# Patient Record
Sex: MALE | Race: WHITE | Employment: OTHER | URBAN - METROPOLITAN AREA
[De-identification: names, ages, dates, MRNs, and addresses within clinical notes are randomized per-mention and may not be internally consistent; named-entity substitution may affect disease eponyms.]

---

## 2017-04-10 ENCOUNTER — HOSPITAL ENCOUNTER (OUTPATIENT)
Dept: GENERAL RADIOLOGY | Age: 72
Discharge: HOME | End: 2017-04-10
Payer: MEDICARE

## 2017-04-10 DIAGNOSIS — C64.9 RENAL CELL CARCINOMA (HCC): ICD-10-CM

## 2017-04-10 PROCEDURE — 71020 XR CHEST PA LAT: CPT

## 2017-12-12 ENCOUNTER — HOSPITAL ENCOUNTER (OUTPATIENT)
Dept: MRI IMAGING | Age: 72
Discharge: HOME | End: 2017-12-12
Attending: INTERNAL MEDICINE
Payer: MEDICARE

## 2017-12-12 VITALS — RESPIRATION RATE: 14 BRPM | HEART RATE: 60 BPM

## 2017-12-12 DIAGNOSIS — I48.91 ATRIAL FIBRILLATION (HCC): ICD-10-CM

## 2017-12-12 LAB — CREAT BLD-MCNC: 1.2 MG/DL (ref 0.6–1.3)

## 2017-12-12 PROCEDURE — 75561 CARDIAC MRI FOR MORPH W/DYE: CPT

## 2017-12-12 PROCEDURE — 74011636320 HC RX REV CODE- 636/320: Performed by: INTERNAL MEDICINE

## 2017-12-12 PROCEDURE — 74011250636 HC RX REV CODE- 250/636: Performed by: INTERNAL MEDICINE

## 2017-12-12 PROCEDURE — 82565 ASSAY OF CREATININE: CPT

## 2017-12-12 PROCEDURE — A9579 GAD-BASE MR CONTRAST NOS,1ML: HCPCS | Performed by: INTERNAL MEDICINE

## 2017-12-12 RX ORDER — MIDAZOLAM HYDROCHLORIDE 1 MG/ML
2 INJECTION, SOLUTION INTRAMUSCULAR; INTRAVENOUS
Status: DISCONTINUED | OUTPATIENT
Start: 2017-12-12 | End: 2017-12-12

## 2017-12-12 RX ORDER — DIPHENHYDRAMINE HYDROCHLORIDE 50 MG/ML
INJECTION, SOLUTION INTRAMUSCULAR; INTRAVENOUS
Status: DISCONTINUED
Start: 2017-12-12 | End: 2017-12-12

## 2017-12-12 RX ORDER — DIPHENHYDRAMINE HYDROCHLORIDE 50 MG/ML
25-50 INJECTION, SOLUTION INTRAMUSCULAR; INTRAVENOUS ONCE
Status: COMPLETED | OUTPATIENT
Start: 2017-12-12 | End: 2017-12-12

## 2017-12-12 RX ADMIN — DIPHENHYDRAMINE HYDROCHLORIDE 50 MG: 50 INJECTION, SOLUTION INTRAMUSCULAR; INTRAVENOUS at 15:02

## 2017-12-12 RX ADMIN — GADOPENTETATE DIMEGLUMINE 40 ML: 469.01 INJECTION INTRAVENOUS at 16:02

## 2017-12-12 RX ADMIN — MIDAZOLAM HYDROCHLORIDE 1 MG: 1 INJECTION, SOLUTION INTRAMUSCULAR; INTRAVENOUS at 15:34

## 2017-12-12 NOTE — ROUTINE PROCESS
discharge instructions per Dr. Chaparrita Luz reviewed with the patient. The patient verbalized understanding. Pt understands his wife is to drive home. Pt drinking water on discharge. Pt stable without c/o pain. No written instructions given to pt. R/t verbal instructions. Dr. Chaparrita Luz ok'd pt. For discharge post procedure.

## 2017-12-12 NOTE — PROGRESS NOTES
Dr. Nicolas Burks instructing to give pt a sip of water and 1 mg Versed IVP. Pt c/o \"dry mouth\". Sip of water given to pt.

## 2017-12-12 NOTE — PROGRESS NOTES
Pt responding to request of MRI Be agarwal, during remaining procedure with Dr. Yves Madrigal viewing and observing pt.

## 2017-12-12 NOTE — PROGRESS NOTES
Preadmission assessment completed per Dr. Hebert Hampton at beginning of procedure and reassessment prior to pt. Getting versed.

## 2017-12-12 NOTE — PROGRESS NOTES
IV started per MRI for procedure - no swelling, pain, bleeding noted from IV site - accessed site without difficulty and flushed with NS.

## 2017-12-12 NOTE — PROGRESS NOTES
Pt states he last had a cup of coffee at 0800 a.m. - Dr. Denis Sanchez requested we have Versed on Hold to give pt. If he needs to administer for his cardiac MRI r/t pt. Being claustrophobic.

## 2017-12-12 NOTE — PROGRESS NOTES
Called by Lela Martinez, MRI, to inform Dr. Lillian Zafar requesting pt. To get Benadryl IVP prior to his Cardiac MRI. Spoke with Dr. Lillian Zafar who ordered 50 mg Benadyl IVP.

## 2020-09-15 ENCOUNTER — HOSPITAL ENCOUNTER (OUTPATIENT)
Dept: MRI IMAGING | Age: 75
Discharge: HOME | End: 2020-09-15
Attending: ORTHOPAEDIC SURGERY

## 2020-09-15 DIAGNOSIS — M54.12 BRACHIAL NEURITIS: ICD-10-CM

## 2020-10-12 ENCOUNTER — HOSPITAL ENCOUNTER (OUTPATIENT)
Dept: MRI IMAGING | Age: 75
Discharge: HOME | End: 2020-10-12
Attending: ORTHOPAEDIC SURGERY
Payer: MEDICARE

## 2020-10-12 VITALS
OXYGEN SATURATION: 100 % | DIASTOLIC BLOOD PRESSURE: 66 MMHG | HEART RATE: 62 BPM | RESPIRATION RATE: 13 BRPM | SYSTOLIC BLOOD PRESSURE: 148 MMHG

## 2020-10-12 PROCEDURE — 99152 MOD SED SAME PHYS/QHP 5/>YRS: CPT

## 2020-10-12 PROCEDURE — 99153 MOD SED SAME PHYS/QHP EA: CPT

## 2020-10-12 PROCEDURE — 72141 MRI NECK SPINE W/O DYE: CPT

## 2020-10-12 PROCEDURE — 74011250636 HC RX REV CODE- 250/636: Performed by: RADIOLOGY

## 2020-10-12 RX ORDER — FENTANYL CITRATE 50 UG/ML
100 INJECTION, SOLUTION INTRAMUSCULAR; INTRAVENOUS
Status: DISCONTINUED | OUTPATIENT
Start: 2020-10-12 | End: 2020-10-12

## 2020-10-12 RX ORDER — MIDAZOLAM HYDROCHLORIDE 1 MG/ML
5 INJECTION, SOLUTION INTRAMUSCULAR; INTRAVENOUS
Status: DISCONTINUED | OUTPATIENT
Start: 2020-10-12 | End: 2020-10-12

## 2020-10-12 RX ADMIN — MIDAZOLAM HYDROCHLORIDE 1 MG: 1 INJECTION, SOLUTION INTRAMUSCULAR; INTRAVENOUS at 07:57

## 2020-10-12 RX ADMIN — FENTANYL CITRATE 25 MCG: 50 INJECTION, SOLUTION INTRAMUSCULAR; INTRAVENOUS at 07:57

## 2020-10-12 RX ADMIN — MIDAZOLAM HYDROCHLORIDE 1 MG: 1 INJECTION, SOLUTION INTRAMUSCULAR; INTRAVENOUS at 07:53

## 2020-10-12 RX ADMIN — MIDAZOLAM HYDROCHLORIDE 1 MG: 1 INJECTION, SOLUTION INTRAMUSCULAR; INTRAVENOUS at 08:03

## 2020-10-12 RX ADMIN — FENTANYL CITRATE 25 MCG: 50 INJECTION, SOLUTION INTRAMUSCULAR; INTRAVENOUS at 07:53

## 2020-10-12 RX ADMIN — FENTANYL CITRATE 25 MCG: 50 INJECTION, SOLUTION INTRAMUSCULAR; INTRAVENOUS at 08:03

## 2020-10-12 NOTE — DISCHARGE INSTRUCTIONS
Sedation for a Medical Procedure: After Your Visit  Instructions. Sedatives are used to relax you for a procedure. You may or may not be awake during the procedure. Common side effects of sedation medications include:                   Drowsiness, dizziness, euphoria, sleepiness or confusion. I              Unsteady gait. Loss of fine muscle control and delayed reaction time. Visual disturbances, difficulty focusing and blurred vision. Impaired memory recall. Follow-up care is a key component for your health and safety. Be sure to make and go to all medical appointments. Call your doctor if you are having problems. It's also a good idea to keep a list of your allergies, medicines with doses and test results on hand    Home care following your sedation procedure: You may experience some of these side effects or you may not be aware of subtle changes in your behavior or reaction time. Because you received these medications, we are giving you the following instructions: Activity    For your safety, you should not drive until the medicine wears off and you can think clearly and react easily. Do not drive for 24 hours.  Rest when you feel tired. Getting enough sleep will help you recover. Diet     You can eat your normal diet. If your stomach is upset, try bland, low-fat foods like plain rice, broiled chicken, toast, and yogurt.  Drink plenty of fluids unless your doctor advised you to limit your fluids.  Do not consume alcoholic beverages for 24 hours. Instructions   Do not make important personal, business, or legal decisions for 24 hours.  Move slowly and carefully, do not make sudden position changes.  Be alert for dizziness or lightheadedness and move accordingly.  Have a responsible person assist you.  Do not drive for 24 hours.  Do not operate equipment for 24 hours. [employer], power tools, kitchen appliances, etc.)    Discharge medications:   Resume prior to test medications as prescribed by your personal physician. If you have any questions or concerns call Radiology RN at [phone]  After hours call Radiology on-call at [phone], [facility]      Call 911 any time you think you may need emergency care. For example:                Call if you passed out (lost consciousness).  The medicine is not wearing off and you cannot think clearly. Watch closely for changes in your health, and be sure to contact your doctor if you have any problems. Where can you learn more? Go to [email or web address]   Enter G817 in the search box to learn more about \"Sedation for a Medical Procedure: After Your Visit. \"

## 2020-10-12 NOTE — H&P
Radiology History and Physical    Patient: Latonia North 69 y.o. male       Chief Complaint: No chief complaint on file.       History of Present Illness: Neck pain    History:    Past Medical History:   Diagnosis Date    Allergic rhinitis     Arthritis     BPH (benign prostatic hypertrophy)     CAD (coronary artery disease)     MI x6, last one appr 5 yr ago    Cancer [facility]) 2012    renal    Chronic kidney disease     renal insufficiency    COPD     and emphysema- 2 liters O2 at night and prn    Dyslipidemia     GERD (gastroesophageal reflux disease)     Heart failure (HCC)     CHF    Hypertension     Ill-defined condition     pulmonary nodule- CT every year    Leukemia in remission ([facility] [address] 14     leukemia - currently in remission- completed chemo     MI (myocardial infarction) ([facility] [address]     x 6    Obesity     Psychiatric disorder     depression    Unspecified adverse effect of anesthesia     respiratory failure after CABG -- vented for extended time- ICU for 8 days    Unspecified sleep apnea     uses CPAP nightly     Family History   Problem Relation Age of Onset    Hypertension Father      Social History     Socioeconomic History    Marital status:      Spouse name: Not on file    Number of children: Not on file    Years of education: Not on file    Highest education level: Not on file   Occupational History    Not on file   Social Needs    Financial resource strain: Not on file    Food insecurity     Worry: Not on file     Inability: Not on file    Transportation needs     Medical: Not on file     Non-medical: Not on file   Tobacco Use    Smoking status: Former Smoker     Packs/day: 3.00     Years: 35.00     Pack years: 105.00     Last attempt to quit: 2001     Years since quittin.0    Smokeless tobacco: Never Used   Substance and Sexual Activity    Alcohol use: No    Drug use: No    Sexual activity: Not on file   Lifestyle    Physical activity     Days per week: Not on file     Minutes per session: Not on file    Stress: Not on file   Relationships    Social connections     Talks on phone: Not on file     Gets together: Not on file     Attends [employer] service: Not on file     Active member of club or organization: Not on file     Attends meetings of clubs or organizations: Not on file     Relationship status: Not on file    Intimate partner violence     Fear of current or ex partner: Not on file     Emotionally abused: Not on file     Physically abused: Not on file     Forced sexual activity: Not on file   Other Topics Concern    Not on file   Social History Narrative    Not on file       Allergies: Allergies   Allergen Reactions    Zithromax [Azithromycin] Photosensitivity     Developed skin blisters       Current Medications:  Current Outpatient Medications   Medication Sig    lqzwzs-nszzp-i. blue-sal-naphos (URELLE) 81-0.12 mg tab tablet Take 1 Tab by mouth four (4) times daily as needed for Other (burning on urination).  potassium chloride (K-DUR, KLOR-CON) 20 mEq tablet Take 20 mEq by mouth daily.  oxyCODONE-acetaminophen (PERCOCET) 5-325 mg per tablet Take 1-2 Tabs by mouth every four (4) hours as needed for Pain. Max Daily Amount: 12 Tabs.  albuterol sulfate (PROVENTIL;VENTOLIN) 2.5 mg/0.5 mL nebu nebulizer solution by Nebulization route every four (4) hours as needed for Wheezing.  amiodarone (PACERONE) 100 mg tablet Take 100 mg by mouth daily.  HYDROcodone-acetaminophen (NORCO) 7.5-325 mg per tablet Take 1 Tab by mouth every six (6) hours as needed for Pain.  apixaban (ELIQUIS) 5 mg tablet Take 5 mg by mouth two (2) times a day.  Urinary Bag (URINARY LEG BAG) misc 1 Device by Does Not Apply route daily.  metoprolol (LOPRESSOR) 50 mg tablet Take 25 mg by mouth two (2) times a day.  tamsulosin (FLOMAX) 0.4 mg capsule Take 0.4 mg by mouth two (2) times a day.     budesonide-formoterol (SYMBICORT) 80-4.5 mcg/actuation HFAA inhaler Take 2 Puffs by inhalation two (2) times a day.  Cholecalciferol, Vitamin D3, (VITAMIN D3) 1,000 unit cap Take 1 Tab by mouth daily.  Omeprazole delayed release (PRILOSEC D/R) 20 mg tablet Take 20 mg by mouth two (2) times a day.  tiotropium (SPIRIVA WITH HANDIHALER) 18 mcg inhalation capsule Take 1 Cap by inhalation daily.  albuterol (PROVENTIL, VENTOLIN) 90 mcg/Actuation inhaler Take 2 Puffs by inhalation every six (6) hours as needed.  omega-3 fatty acids-vitamin e (FISH OIL) 1,000 mg Cap Take 1 Cap by mouth. Takes 2 tab in am, 1 tab in pm     citalopram (CELEXA) 20 mg tablet Take 20 mg by mouth nightly.  rosuvastatin (CRESTOR) 20 mg tablet Take 20 mg by mouth nightly.  loratadine (CLARITIN) 10 mg tablet Take 10 mg by mouth daily.  coenzyme q10 (CO Q-10) 200 mg Cap Take 1 Cap by mouth nightly. Current Facility-Administered Medications   Medication Dose Route Frequency    midazolam (PF) (VERSED) injection 5 mg  5 mg IntraVENous RAD PRN    fentaNYL citrate (PF) injection 100 mcg  100 mcg IntraVENous RAD PRN        Physical Exam:  Blood pressure (!) 157/73, pulse (!) 57, resp. rate 11. Heart RRR, Clear lungs      Alerts:    Hospital Problems  Date Reviewed: 4/15/2014    None          Laboratory:    No results for input(s): HGB, HCT, WBC, PLT, INR, BUN, CREA, K, CRCLT, HGBEXT, HCTEXT, PLTEXT, INREXT in the last 72 hours. No lab exists for component: PTT, PT      Plan of Care/Planned Procedure:  Risks, benefits, and alternatives reviewed with patient and he agrees to proceed with the procedure.        Yaw Herrera MD    History and Physical

## 2020-10-12 NOTE — PROGRESS NOTES
0716 Pt brought back to [facility] for scheduled MRI of C-spine with conscious sedation. Confirmed NPO and ride. 0730  IV placed. LS diminished, S1S2.  [id number] Dr Terry Bergman in [facility] to discuss procedure and sedation plan with pt and RN. Allergies reviewed. Consent signed. 0750  Pt brought to MRI for procedure. Time out performed at 0752. Procedure started at  [id number] and ended at 36. Pt received a total of  3mg of versed and  75mcg  of fentanyl over the course of procedure. Pt tolerated procedure well. Denies pain. VS monitored throughout procedure. 0840  Pt returned to [facility]. Given crackers and juice. 5  Spoke with wife Jeanene Boeck,  to let her know procedure complete and pt doing well. 3181 Reviewed discharge instructions with patient. Opportunity given for questions. Pt verbalized understanding. Copy provided. Pt dressed and  IV removed. 4762 Escorted pt out to vehicle in w/c for discharge to home with wife.